# Patient Record
Sex: FEMALE | URBAN - METROPOLITAN AREA
[De-identification: names, ages, dates, MRNs, and addresses within clinical notes are randomized per-mention and may not be internally consistent; named-entity substitution may affect disease eponyms.]

---

## 2023-02-01 ENCOUNTER — DOCUMENTATION (OUTPATIENT)
Dept: FAMILY MEDICINE CLINIC | Facility: CLINIC | Age: 88
End: 2023-02-01

## 2023-02-02 NOTE — PROGRESS NOTES
I spoke with patient and her son last evening she tested positive for COVID she called requesting medication to treat COVID we discussed the pros and cons of Paxlovid I discussed the drug interaction with Eliquis, I reviewed her medication list, normal GFR, patient was instructed to reduce her Eliquis by half while on Paxlovid, prescription was sent to [facility].  Instructed patient to monitor her oxygen saturation if it drops below 90 to go to the ER, her son will be staying with her while she is sick.